# Patient Record
Sex: FEMALE | Race: WHITE | Employment: FULL TIME | URBAN - METROPOLITAN AREA
[De-identification: names, ages, dates, MRNs, and addresses within clinical notes are randomized per-mention and may not be internally consistent; named-entity substitution may affect disease eponyms.]

---

## 2018-06-26 ENCOUNTER — OFFICE VISIT (OUTPATIENT)
Dept: FAMILY MEDICINE CLINIC | Age: 28
End: 2018-06-26

## 2018-06-26 VITALS
HEART RATE: 85 BPM | OXYGEN SATURATION: 98 % | TEMPERATURE: 98.1 F | DIASTOLIC BLOOD PRESSURE: 75 MMHG | HEIGHT: 64 IN | WEIGHT: 146.5 LBS | BODY MASS INDEX: 25.01 KG/M2 | RESPIRATION RATE: 20 BRPM | SYSTOLIC BLOOD PRESSURE: 112 MMHG

## 2018-06-26 DIAGNOSIS — Z01.818 PREOP EXAMINATION: Primary | ICD-10-CM

## 2018-06-26 DIAGNOSIS — R22.9 SKIN NODULE: ICD-10-CM

## 2018-06-26 LAB
HCG URINE, QL. (POC): NEGATIVE
VALID INTERNAL CONTROL?: YES

## 2018-06-26 RX ORDER — DEXTROAMPHETAMINE SACCHARATE, AMPHETAMINE ASPARTATE, DEXTROAMPHETAMINE SULFATE AND AMPHETAMINE SULFATE 5; 5; 5; 5 MG/1; MG/1; MG/1; MG/1
TABLET ORAL
Refills: 0 | COMMUNITY
Start: 2018-05-29

## 2018-06-26 NOTE — PATIENT INSTRUCTIONS
1) Pre-Operative:  - Avoid eating or drinking anything for eight to 12 hours before the procedure, or as directed by surgeon;  - Inform your physician of medications to control diabetes, hypertension, high cholesterol or angina;  - Increase fluid intake a few days before the procedure.  -Please verify that you should take your blood pressure medications with a small sip of water the morning of surgery with your surgeon.  -Ask your surgeon if he/she wants you to discontinue any medication prior to surgery, such as anticoagulants (aspirin, xarelto, etc)   -A scopolamine patch prior to surgery can help with nausea after the surgery. Talk to your surgeon about this medication. Post-Operative:  - Follow instructions from surgeon to help speed recovery  - Good nutrition and sleep with help with healing process    2) Referral to Dr. Denisse Hargrove, General Surgery. Please make an appointment with Dr. Son Valladares. [id number] [facility] at [facility] at [address] [place] [address], [place] [address], [address] [facility], [address]  Phone: [phone]       Bunions: Care Instructions  Your Care Instructions    A bunion is a bump on the outside of the joint at the bottom of your big toe. It can cause pain and swelling in the toe. A bunion forms when bone or tissue around the joint becomes swollen from too much pressure. You also can have a bunionette, or tailor's bunion, which forms on the joint of the little toe. Sometimes, a bunion on the big toe turns the toe in toward the second toe. This is called displacement. It can lead to problems with the other toes. You can get a bunion from having an unusual walking style, having flatfeet, or wearing tight-fitting shoes. You can treat most bunions at home with a few simple steps. If you have a lot of pain, your doctor may inject medicine into the bunion to reduce swelling for a while. If you still have pain, you may need to have surgery.   Follow-up care is a key part of your treatment and safety. Be sure to make and go to all appointments, and call your doctor if you are having problems. It's also a good idea to know your test results and keep a list of the medicines you take. How can you care for yourself at home? · Ask your doctor if you can take an over-the-counter pain medicine, such as acetaminophen (Tylenol), ibuprofen (Advil, Motrin), or naproxen (Aleve). Be safe with medicines. Read and follow all instructions on the label. · Wear shoes that have a wide and deep space for the toes. Also, wear shoes that have low or flat heels and good arch supports. Do not wear tight, narrow, or high-heeled shoes. · Try bunion pads, arch supports, toe spacers, or shoe inserts. They can help shift your weight when you walk to take pressure off your big toe. · Put moleskin or another type of cushion on or around the bunion to keep it from rubbing against your shoe. · Put ice or a cold pack on the area for 10 to 20 minutes at a time as needed. Put a thin cloth between the ice and your skin. · Prop up your foot on a pillow when you ice your toe or anytime you sit or lie down. Try to keep it above the level of your heart. This will help reduce swelling. When should you call for help? Call your doctor now or seek immediate medical care if:  ? · You have severe pain. ? · Your toe is cool or pale or changes color. ? · You have tingling, weakness, or numbness in the toe. ? Watch closely for changes in your health, and be sure to contact your doctor if:  ? · Pain and swelling get worse. ? · You do not get better as expected. Where can you learn more? Go to http://kobe-burke.info/. Enter H210 in the search box to learn more about \"Bunions: Care Instructions. \"  Current as of: March 21, 2017  Content Version: 11.4  © 8060-2750 [employer]. Care instructions adapted under license by [email or web address] (which disclaims liability or warranty for this information).  If you have questions about a medical condition or this instruction, always ask your healthcare professional. [address] any warranty or liability for your use of this information. Bunionectomy: What to Expect at [address] had bunion surgery to remove a lump of bone (bunion) from the joint where your big toe joins your foot, and to straighten your big toe. You will have pain and swelling that slowly improves in the 6 weeks after surgery. You may have some minor pain and swelling that lasts as long as 6 months to a year. After surgery, you will need to wear a cast or a special type of shoe to protect your toe and to keep it in the right position for at least 3 to 6 weeks. After some types of surgeries, a cast or special shoe is used for a few months. Your doctor will remove your stitches or sutures about 2 weeks after the surgery. If you have removable pins holding your toe in place, they are usually removed in about 4 to 6 weeks. This care sheet gives you a general idea about how long it will take for you to recover. But each person recovers at a different pace. Follow the steps below to get better as quickly as possible. How can you care for yourself at home? Activity  ? · Rest when you feel tired. Getting enough sleep will help you recover. ? · Ask your doctor when you can drive again. ? · You may shower, unless your doctor tells you not to. Keep the bandage dry. If the bandage has been removed, you can wash the area with warm water and soap. Pat the area dry. ? · You will probably need to take several weeks off from work. How much time you need to take off depends on the type of work you do and the extent of your surgery. ? · You may need to avoid heavy lifting for 3 to 8 weeks or longer, depending on the type of surgery you had.   ? · You may need to do regular rehabilitation (rehab) exercises to strengthen your foot and improve movement.  Start out slowly, and follow your doctor's instructions. Diet  ? · You can eat your normal diet. If your stomach is upset, try bland, low-fat foods like plain rice, broiled chicken, toast, and yogurt. ? · You may notice that your bowel movements are not regular right after your surgery. This is common. Try to avoid constipation and straining with bowel movements. You may want to take a fiber supplement every day. If you have not had a bowel movement after a couple of days, ask your doctor about taking a mild laxative. Medicines  ? · Your doctor will tell you if and when you can restart your medicines. He or she will also give you instructionsabout taking any new medicines. ? · If you take blood thinners, such as warfarin (Coumadin), clopidogrel (Plavix), or aspirin, be sure to talk to yourdoctor. He or she will tell you if and when to start taking those medicines again. Make sure that you understandexactly what your doctor wants you to do. ? · Be safe with medicines. Take pain medicines exactly as directed. ¨ If the doctor gave you a prescription medicine for pain, take it as prescribed. ¨ If you are not taking a prescription pain medicine, ask your doctor if you can take an over-the-counter medicine. ? · If your doctor prescribed antibiotics, take them as directed. Do not stop taking them just because you feel better. You need to take the full course of antibiotics. ? · If you think your pain medicine is making you sick to your stomach:  ¨ Take your medicine after meals (unless your doctor has told you not to). ¨ Ask your doctor for a different pain medicine. Incision care  ? · You will leave the hospital with bandages holding your toe in the correct position. Your doctor will probably remove the bandages after several days. Or your doctor may have you remove your bandages at home. Do not touch the surgery area. Keep it dry. ? · Do not soak your foot until your doctor says it is okay. Ice and elevation  ? · For pain and swelling, put ice or a cold pack on your foot for 10 to 20 minutes each hour. Put a thin cloth between the ice and your skin. ? · Prop up your foot and leg on a pillow when you ice it or anytime you sit or lie down during the next 3 days. Try to keep it above the level of your heart. This will help reduce swelling. Follow-up care is a key part of your treatment and safety. Be sure to make and go to all appointments, and call your doctor if you are having problems. It's also a good idea to know your test results and keep a list of the medicines you take. When should you call for help? Call 911 anytime you think you may need emergency care. For example, call if:  ? · You passed out (lost consciousness). ? · You have sudden chest pain and shortness of breath, or you cough up blood. ? · You have severe trouble breathing. ?Call your doctor now or seek immediate medical care if:  ? · Your foot or toe is cool or pale or changes color. ? · You have numbness, tingling, or less feeling in your foot or toes. ? · You have pain that does not get better after you take pain medicine. ? · You have loose stitches, or your incision comes open. ? · Bright red blood has soaked through the bandage over your incision. ? · You have signs of infection, such as:  ¨ Increased pain, swelling, warmth, or redness. ¨ Red streaks leading from the incision. ¨ Pus draining from the incision. ¨ Swollen lymph nodes in your neck, armpits, or groin. ¨ A fever. ? Watch closely for any changes in your health, and be sure to contact your doctor if:  ? · You do not have a bowel movement after taking a laxative. Where can you learn more? Go to http://kobe-burke.info/. Enter 0699 472 [id number] in the search box to learn more about \"Bunionectomy: What to Expect at Home. \"  Current as of: March 21, 2017  Content Version: 11.4  © 1185-5562 [employer].  Care instructions adapted under license by [employer] (which disclaims liability or warranty for this information). If you have questions about a medical condition or this instruction, always ask your healthcare professional. [address] any warranty or liability for your use of this information.

## 2018-06-26 NOTE — MR AVS SNAPSHOT
[address] [place] [address] 
[address] [place] [address] 
[phone] Patient: Regan Funez MRN: [id number] :1990 Visit Information Date & Time Provider Department Dept. Phone Encounter #  
 2018  1:20 PM Yocasta Sainz NP [facility] [phone] [id number] Upcoming Health Maintenance Date Due  
 HPV Age 9Y-34Y (1 of 1 - Female 3 Dose Series) 10/23/2002 PAP AKA CERVICAL CYTOLOGY 10/23/2012 Influenza Age 5 to Adult 2018 DTaP/Tdap/Td series (2 - Td) 2026 Allergies as of 2018  Review Complete On: 2018 By: Yocasta Sainz NP No Known Allergies Current Immunizations  Never Reviewed No immunizations on file. Not reviewed this visit You Were Diagnosed With   
  
 Codes Comments Preop examination    -  Primary ICD-10-CM: D02.378 ICD-9-CM: V72.84 Skin nodule     ICD-10-CM: R22.9 ICD-9-CM: 950. 2 Vitals BP Pulse Temp Resp Height(growth percentile) Weight(growth percentile) 112/75 (BP 1 Location: Left arm, BP Patient Position: Sitting) 85 98.1 °F (36.7 °C) (Oral) 20 5' 4\" (1.626 m) 146 lb 8 oz (66.5 kg) SpO2 BMI OB Status Smoking Status 98% 25.15 kg/m2 Having regular periods Never Smoker BMI and BSA Data Body Mass Index Body Surface Area  
 25.15 kg/m 2 1.73 m 2 Your Updated Medication List  
  
   
This list is accurate as of 18  2:17 PM.  Always use your most recent med list.  
  
  
  
  
 desog-e.estradiol/e.estradiol 0.15-0.02 mgx21 /0.01 mg x 5 Tab Commonly known as:  Aide Izabella Take  by mouth. dextroamphetamine-amphetamine 20 mg tablet Commonly known as:  ADDERALL  
take 2 and 1/2 tablets by mouth daily We Performed the Following AMB POC URINE PREGNANCY TEST, VISUAL COLOR COMPARISON [23016 CPT(R)] METABOLIC PANEL, BASIC [56665 CPT(R)] REFERRAL TO GENERAL SURGERY [REF27 Custom] Comments:  
 Please evaluate nodule on right pectoral. THanks. Referral Information Referral ID Referred By Referred To  
  
 [id number] Harini Dempsey MD   
   [address] [facility] [phone] [id number] [place] [address], [address] Phone: [phone] Fax: [phone] Visits Status Start Date End Date 1 New Request 6/26/18 6/26/19 If your referral has a status of pending review or denied, additional information will be sent to support the outcome of this decision. Patient Instructions 1) Pre-Operative: - Avoid eating or drinking anything for eight to 12 hours before the procedure, or as directed by surgeon; 
- Inform your physician of medications to control diabetes, hypertension, high cholesterol or angina; 
- Increase fluid intake a few days before the procedure. 
-Please verify that you should take your blood pressure medications with a small sip of water the morning of surgery with your surgeon. 
-Ask your surgeon if he/she wants you to discontinue any medication prior to surgery, such as anticoagulants (aspirin, xarelto, etc)  
-A scopolamine patch prior to surgery can help with nausea after the surgery. Talk to your surgeon about this medication. Post-Operative: - Follow instructions from surgeon to help speed recovery - Good nutrition and sleep with help with healing process 2) Referral to Dr. Jahaira Morales, General Surgery. Please make an appointment with Dr. Darcy Dean. [address] [facility] Surgery at [facility] at [address] [facility], [place] [address], [address] [facility], [facility], [address] Phone: [phone] Bunions: Care Instructions Your Care Instructions A bunion is a bump on the outside of the joint at the bottom of your big toe. It can cause pain and swelling in the toe. A bunion forms when bone or tissue around the joint becomes swollen from too much pressure.  You also can have a bunionette, or tailor's bunion, which forms on the joint of the little toe. Sometimes, a bunion on the big toe turns the toe in toward the second toe. This is called displacement. It can lead to problems with the other toes. You can get a bunion from having an unusual walking style, having flatfeet, or wearing tight-fitting shoes. You can treat most bunions at home with a few simple steps. If you have a lot of pain, your doctor may inject medicine into the bunion to reduce swelling for a while. If you still have pain, you may need to have surgery. Follow-up care is a key part of your treatment and safety. Be sure to make and go to all appointments, and call your doctor if you are having problems. It's also a good idea to know your test results and keep a list of the medicines you take. How can you care for yourself at home? · Ask your doctor if you can take an over-the-counter pain medicine, such as acetaminophen (Tylenol), ibuprofen (Advil, Motrin), or naproxen (Aleve). Be safe with medicines. Read and follow all instructions on the label. · Wear shoes that have a wide and deep space for the toes. Also, wear shoes that have low or flat heels and good arch supports. Do not wear tight, narrow, or high-heeled shoes. · Try bunion pads, arch supports, toe spacers, or shoe inserts. They can help shift your weight when you walk to take pressure off your big toe. · Put moleskin or another type of cushion on or around the bunion to keep it from rubbing against your shoe. · Put ice or a cold pack on the area for 10 to 20 minutes at a time as needed. Put a thin cloth between the ice and your skin. · Prop up your foot on a pillow when you ice your toe or anytime you sit or lie down. Try to keep it above the level of your heart. This will help reduce swelling. When should you call for help? Call your doctor now or seek immediate medical care if: 
? · You have severe pain. ? · Your toe is cool or pale or changes color. ? · You have tingling, weakness, or numbness in the toe. ? Watch closely for changes in your health, and be sure to contact your doctor if: 
? · Pain and swelling get worse. ? · You do not get better as expected. Where can you learn more? Go to http://kobe-burke.info/. Enter H210 in the search box to learn more about \"Bunions: Care Instructions. \" Current as of: March 21, 2017 Content Version: 11.4 © 0987-3137 Nujira. Care instructions adapted under license by Kupon[employer] (which disclaims liability or warranty for this information). If you have questions about a medical condition or this instruction, always ask your healthcare professional. [address] any warranty or liability for your use of this information. Bunionectomy: What to Expect at [facility] Your Recovery You had bunion surgery to remove a lump of bone (bunion) from the joint where your big toe joins your foot, and to straighten your big toe. You will have pain and swelling that slowly improves in the 6 weeks after surgery. You may have some minor pain and swelling that lasts as long as 6 months to a year. After surgery, you will need to wear a cast or a special type of shoe to protect your toe and to keep it in the right position for at least 3 to 6 weeks. After some types of surgeries, a cast or special shoe is used for a few months. Your doctor will remove your stitches or sutures about 2 weeks after the surgery. If you have removable pins holding your toe in place, they are usually removed in about 4 to 6 weeks. This care sheet gives you a general idea about how long it will take for you to recover. But each person recovers at a different pace. Follow the steps below to get better as quickly as possible. How can you care for yourself at home? Activity ? · Rest when you feel tired. Getting enough sleep will help you recover. ? · Ask your doctor when you can drive again. ? · You may shower, unless your doctor tells you not to. Keep the bandage dry. If the bandage has been removed, you can wash the area with warm water and soap. Pat the area dry. ? · You will probably need to take several weeks off from work. How much time you need to take off depends on the type of work you do and the extent of your surgery. ? · You may need to avoid heavy lifting for 3 to 8 weeks or longer, depending on the type of surgery you had.  
? · You may need to do regular rehabilitation (rehab) exercises to strengthen your foot and improve movement. Start out slowly, and follow your doctor's instructions. Diet ? · You can eat your normal diet. If your stomach is upset, try bland, low-fat foods like plain rice, broiled chicken, toast, and yogurt. ? · You may notice that your bowel movements are not regular right after your surgery. This is common. Try to avoid constipation and straining with bowel movements. You may want to take a fiber supplement every day. If you have not had a bowel movement after a couple of days, ask your doctor about taking a mild laxative. Medicines ? · Your doctor will tell you if and when you can restart your medicines. He or she will also give you instructionsabout taking any new medicines. ? · If you take blood thinners, such as warfarin (Coumadin), clopidogrel (Plavix), or aspirin, be sure to talk to yourdoctor. He or she will tell you if and when to start taking those medicines again. Make sure that you understandexactly what your doctor wants you to do. ? · Be safe with medicines. Take pain medicines exactly as directed. ¨ If the doctor gave you a prescription medicine for pain, take it as prescribed. ¨ If you are not taking a prescription pain medicine, ask your doctor if you can take an over-the-counter medicine. ? · If your doctor prescribed antibiotics, take them as directed. Do not stop taking them just because you feel better. You need to take the full course of antibiotics. ? · If you think your pain medicine is making you sick to your stomach: 
¨ Take your medicine after meals (unless your doctor has told you not to). ¨ Ask your doctor for a different pain medicine. Incision care ? · You will leave the hospital with bandages holding your toe in the correct position. Your doctor will probably remove the bandages after several days. Or your doctor may have you remove your bandages at home. Do not touch the surgery area. Keep it dry. ? · Do not soak your foot until your doctor says it is okay. Ice and elevation ? · For pain and swelling, put ice or a cold pack on your foot for 10 to 20 minutes each hour. Put a thin cloth between the ice and your skin. ? · Prop up your foot and leg on a pillow when you ice it or anytime you sit or lie down during the next 3 days. Try to keep it above the level of your heart. This will help reduce swelling. Follow-up care is a key part of your treatment and safety. Be sure to make and go to all appointments, and call your doctor if you are having problems. It's also a good idea to know your test results and keep a list of the medicines you take. When should you call for help? Call 911 anytime you think you may need emergency care. For example, call if: 
? · You passed out (lost consciousness). ? · You have sudden chest pain and shortness of breath, or you cough up blood. ? · You have severe trouble breathing. ?Call your doctor now or seek immediate medical care if: 
? · Your foot or toe is cool or pale or changes color. ? · You have numbness, tingling, or less feeling in your foot or toes. ? · You have pain that does not get better after you take pain medicine. ? · You have loose stitches, or your incision comes open. ? · Bright red blood has soaked through the bandage over your incision. ? · You have signs of infection, such as: 
¨ Increased pain, swelling, warmth, or redness. ¨ Red streaks leading from the incision. ¨ Pus draining from the incision. ¨ Swollen lymph nodes in your neck, armpits, or groin. ¨ A fever. ? Watch closely for any changes in your health, and be sure to contact your doctor if: 
? · You do not have a bowel movement after taking a laxative. Where can you learn more? Go to http://kobe-burke.info/. Enter 0699 [id number] in the search box to learn more about \"Bunionectomy: What to Expect at Home. \" Current as of: March 21, 2017 Content Version: 11.4 © 8491-4431 Localocracy. Care instructions adapted under license by LISNR (which disclaims liability or warranty for this information). If you have questions about a medical condition or this instruction, always ask your healthcare professional. [place] [address] any warranty or liability for your use of this information. Introducing [facility]! [employer] introduces [employer] patient portal. Now you can access parts of your medical record, email your doctor's office, and request medication refills online. 1. In your internet browser, go to [email or web address] [email or web address] 2. Click on the First Time User? Click Here link in the Sign In box. You will see the New Member Sign Up page. 3. Enter your Choose Energy Access Code exactly as it appears below. You will not need to use this code after youve completed the sign-up process. If you do not sign up before the expiration date, you must request a new code. · [employer] Access Code: [email or web address] Expires: 9/24/2018  2:17 PM 
 
4. Enter the last four digits of your Social Security Number (xxxx) and Date of Birth (mm/dd/yyyy) as indicated and click Submit. You will be taken to the next sign-up page. 5. Create a RedBee ID. This will be your RedBee login ID and cannot be changed, so think of one that is secure and easy to remember. 6. Create a RedBee password. You can change your password at any time. 7. Enter your Password Reset Question and Answer. This can be used at a later time if you forget your password. 8. Enter your e-mail address. You will receive e-mail notification when new information is available in [address] 9. Click Sign Up. You can now view and download portions of your medical record. 10. Click the Download Summary menu link to download a portable copy of your medical information. If you have questions, please visit the Frequently Asked Questions section of the RedBee website. Remember, RedBee is NOT to be used for urgent needs. For medical emergencies, dial 911. Now available from your iPhone and Android! Please provide this summary of care documentation to your next provider. Your primary care clinician is listed as Marilu Don. If you have any questions after today's visit, please call [phone].

## 2018-06-26 NOTE — PROGRESS NOTES
Chief Complaint   Patient presents with    Pre-op Exam     bunion on left foot/surgery 2018    Other     routine    Other     pregnancy test       Luis Mckenna  Identified pt with two pt identifiers(name and ). Chief Complaint   Patient presents with    Pre-op Exam     bunion on left foot/surgery 2018    Other     routine    Other     pregnancy test       1. Have you been to the ER, urgent care clinic since your last visit? N Hospitalized since your last visit? No    2. Have you seen or consulted any other health care providers outside of the [address] [place] [address] since your last visit? N  Include any pap smears or colon screening. No    Today's provider has been notified of reason for visit, vitals and flowsheets obtained on patients.      Patient received paperwork for advance directive during previous visit but has not completed at this time     Reviewed record In preparation for visit, huddled with provider and have obtained necessary documentation    Verbal order to collect urine and perform pregnancy test    Health Maintenance Due   Topic    HPV Age 9Y-34Y (1 of 3 - Female 3 Dose Series)    PAP AKA CERVICAL CYTOLOGY        Wt Readings from Last 3 Encounters:   18 146 lb 8 oz (66.5 kg)   16 143 lb 9.6 oz (65.1 kg)   14 143 lb 3.2 oz (65 kg)     Temp Readings from Last 3 Encounters:   18 98.1 °F (36.7 °C) (Oral)   16 96.7 °F (35.9 °C) (Oral)   14 97.5 °F (36.4 °C) (Oral)     BP Readings from Last 3 Encounters:   18 112/75   16 93/49   14 91/65     Pulse Readings from Last 3 Encounters:   18 85   16 65   14 67     Vitals:    18 1342   BP: 112/75   Pulse: 85   Resp: 20   Temp: 98.1 °F (36.7 °C)   TempSrc: Oral   SpO2: 98%   Weight: 146 lb 8 oz (66.5 kg)   Height: 5' 4\" (1.626 m)   PainSc:   0 - No pain         Learning Assessment:  :     Learning Assessment 2014   PRIMARY LEARNER Patient Patient   HIGHEST LEVEL OF EDUCATION - PRIMARY LEARNER  4 YEARS OF COLLEGE 4 YEARS OF COLLEGE   BARRIERS PRIMARY LEARNER NONE NONE   CO-LEARNER CAREGIVER - No   PRIMARY LANGUAGE ENGLISH ENGLISH   LEARNER PREFERENCE PRIMARY DEMONSTRATION LISTENING     LISTENING -     PICTURES -     READING -     VIDEOS -   ANSWERED BY patient patient   RELATIONSHIP SELF SELF       Depression Screening:  :     PHQ over the last two weeks 6/26/2018   Little interest or pleasure in doing things Not at all   Feeling down, depressed or hopeless Not at all   Total Score PHQ 2 0       Fall Risk Assessment:  :     No flowsheet data found. Abuse Screening:  :     No flowsheet data found. ADL Screening:  :     ADL Assessment 6/26/2018   Feeding yourself No Help Needed   Getting from bed to chair No Help Needed   Getting dressed No Help Needed   Bathing or showering No Help Needed   Walk across the room (includes cane/walker) No Help Needed   Using the telphone No Help Needed   Taking your medications No Help Needed   Preparing meals No Help Needed   Managing money (expenses/bills) No Help Needed   Moderately strenuous housework (laundry) No Help Needed   Shopping for personal items (toiletries/medicines) No Help Needed   Shopping for groceries No Help Needed   Driving No Help Needed   Climbing a flight of stairs No Help Needed   Getting to places beyond walking distances No Help Needed                 Medication reconciliation up to date and corrected with patient at this time.

## 2018-06-26 NOTE — PROGRESS NOTES
S: Caprice Crum is a 33 y.o. female who presents for pre-operative clearance  Patient has paperwork and pre op work up requires BMP, preg test    Assessment/Plan:  1. Preop examination  -Procedure: bunion on left foot   -BMP today, preg = negative  -Based upon the examination performed today, no contraindications have been noted and Caprice Crum  is an acceptable risk for the proposed procedure, pending results of BMP.      2. Skin lesion  -chest: right pectoral - 1cm mobile nodule  -referral to Dr. Mario Victoria, gen surg    Forms filled out, copy given to pt and paperwork will be faxed to [phone] once BMP results are finalized     HPI:    Surgical procedure: left bunion removal   Anesthesia: pt unsure  Surgery date: 7/26/18  Surgeon: Dr. Serge Anderson  Phone: [phone]              No allergy to latex  No allergies to banana, kiwi or avocado  No problems breathing during dental exam  Previous surgeries - no previous reaction to anesthesia  No Obstructive sleep apnea  No metal in body  Anti-coag: none  LMP: 6/19/18  Preg test = neg    This is considered a low to intermediate risk surgery.   Functional capacity is >4 Metabolic Equivalents (METs) without symptoms - METS 6+    Lump on chest   [facility] - said to watch it  Was told to go to breast surgeon   Sometimes painful, bothers her when she is lifting weight     Review of Systems:  - Constitutional Symptoms: no fevers, chills, weight loss  - Eyes: no blurry vision or double vision  - Cardiovascular: no chest pain or palpitations  - Respiratory: no cough or shortness of breath  - Gastrointestinal: no dysphagia or abdominal pain, + heartburn and will take antacid and will help  - Musculoskeletal: no joint pains or weakness  - Integumentary: no rashes  - Neurological: no numbness, tingling, or headaches    Social history:   Nutrition: overall healthy, water 64oz   Physical: minimal due to bunion   Social: lives with fiancee, planning to get  May 2019   Occupation:  for [employer]   Tobacco/Drugs: none   Alcohol: 4 drinks/ week     I reviewed the following:  Past Medical History:   Diagnosis Date    Irregular menses     Varicose veins 2014    Dr. Sue Gallagher (Reflux in left Greater Saphenous) s/p thermal ablation and micro-phlebectomy     The patient has a family history of  Current Outpatient Prescriptions   Medication Sig Dispense Refill    dextroamphetamine-amphetamine (ADDERALL) 20 mg tablet take 2 and 1/2 tablets by mouth daily  0    desog-e.estradiol/e.estradiol (KARIVA) 0.15-0.02 mgx21 /0.01 mg x 5 tab Take  by mouth. Focus MD prescribing adderall     No Known Allergies    O: VS:   Visit Vitals    /75 (BP 1 Location: Left arm, BP Patient Position: Sitting)    Pulse 85    Temp 98.1 °F (36.7 °C) (Oral)    Resp 20    Ht 5' 4\" (1.626 m)    Wt 146 lb 8 oz (66.5 kg)    SpO2 98%    BMI 25.15 kg/m2     GENERAL: Rani Monaco is in no acute distress. Non-toxic. Well nourished. Well developed. Appropriately groomed. HEAD:  Normocephalic. Atraumatic. Non tender sinuses x 4. EYE: PERRLA. EOMs intact. Sclera anicteric without injection. No drainage or discharge. EARS: Hearing intact bilaterally. External ear canals normal without evidence of blood or swelling. Bilateral TM's intact, pearly grey with landmarks visible. No erythema or effusion. NOSE: Patent. Nasal turbinates pink. No erythema. No discharge. MOUTH: mucous membranes pink and moist. Posterior pharynx normal with cobblestone appearance. No erythema, white exudate or obstruction. NECK: supple. Midline trachea. No cervical adenopathy noted. RESP: Breath sounds are symmetrical bilaterally. Unlabored without SOB. Speaking in full sentences. Clear to auscultation bilaterally anteriorly and posteriorly. No wheezes. No rales or rhonchi. CV: normal rate. Regular rhythm. S1, S2 audible. No murmur noted. No rubs, clicks or gallops noted.   ABDOMEN: Flat without bulges or pulsations. Soft and nondistended. No tenderness on palpation. No masses or organomegaly. No rebound, rigidity or guarding. Bowel sounds normal x 4 quadrants. BACK: No visible deformities or curvature. Full ROM. No pain on palpation of the spinous processes in the cervical, thoracic, lumbar, sacral regions. No CVA tenderness. NEURO:  awake, alert and oriented to person, place, and time and event. Cranial nerves II through XII intact. Clear speech. Muscle strength is +5/5 x 4 extremities. Sensation is intact to light touch bilaterally. Steady gait. MUSC:  Intact x 4 extremities. Full ROM x 4 extremities. No pain with movement. HEME/LYMPH: peripheral pulses palpable 2+ x 4 extremities. No peripheral edema is noted. No rashes noted. SKIN: clean and dry. Good turgor. Chest: right pectoral - 1cm mobile nodule. No ttp  PSYCH: appropriate behavior, dress and thought processes. Good eye contact. Clear and coherent speech. Full affect. Good insight.   _____________________________________________________________________  Patient education was done. Advised on nutrition, physical activity, weight management, tobacco, alcohol and safety. Patient verbalized understanding and agreed to plan of care. Patient was given an after visit summary which included current diagnoses, medications and vital signs.

## 2018-06-27 LAB
BUN SERPL-MCNC: 9 MG/DL (ref 6–20)
BUN/CREAT SERPL: 14 (ref 9–23)
CALCIUM SERPL-MCNC: 9.5 MG/DL (ref 8.7–10.2)
CHLORIDE SERPL-SCNC: 101 MMOL/L (ref 96–106)
CO2 SERPL-SCNC: 24 MMOL/L (ref 20–29)
CREAT SERPL-MCNC: 0.64 MG/DL (ref 0.57–1)
GLUCOSE SERPL-MCNC: 93 MG/DL (ref 65–99)
POTASSIUM SERPL-SCNC: 4.2 MMOL/L (ref 3.5–5.2)
SODIUM SERPL-SCNC: 138 MMOL/L (ref 134–144)

## 2018-07-24 ENCOUNTER — OFFICE VISIT (OUTPATIENT)
Dept: SURGERY | Age: 28
End: 2018-07-24

## 2018-07-24 VITALS
RESPIRATION RATE: 18 BRPM | WEIGHT: 148 LBS | BODY MASS INDEX: 25.27 KG/M2 | OXYGEN SATURATION: 97 % | SYSTOLIC BLOOD PRESSURE: 130 MMHG | TEMPERATURE: 97.8 F | DIASTOLIC BLOOD PRESSURE: 76 MMHG | HEART RATE: 105 BPM | HEIGHT: 64 IN

## 2018-07-24 DIAGNOSIS — R22.9 SKIN NODULE: Primary | ICD-10-CM

## 2018-07-24 NOTE — PROGRESS NOTES
1. Have you been to the ER, urgent care clinic since your last visit? Hospitalized since your last visit? No    2. Have you seen or consulted any other health care providers outside of the [address] [place] [address] since your last visit? Include any pap smears or colon screening.  No

## 2018-07-24 NOTE — MR AVS SNAPSHOT
[address] [facility] [address] [id number] [phone] Patient: Diallo Bean MRN: [id number] :1990 Visit Information Date & Time Provider Department Dept. Phone Encounter #  
 2018  3:45 PM Everton Bennett, [address] [phone] [id number] Follow-up Instructions Return in about 6 months (around 2019). Routing History Upcoming Health Maintenance Date Due  
 HPV Age 9Y-34Y (1 of 1 - Female 3 Dose Series) 10/23/2002 PAP AKA CERVICAL CYTOLOGY 10/23/2012 Influenza Age 5 to Adult 2018 DTaP/Tdap/Td series (2 - Td) 2026 Allergies as of 2018  Review Complete On: 2018 By: Sandrita Cook LPN No Known Allergies Current Immunizations  Never Reviewed No immunizations on file. Not reviewed this visit You Were Diagnosed With   
  
 Codes Comments Skin nodule    -  Primary ICD-10-CM: R22.9 ICD-9-CM: 092. 2 Vitals BP Pulse Temp Resp Height(growth percentile) Weight(growth percentile) 130/76 (BP 1 Location: Right arm, BP Patient Position: Sitting) (!) 105 97.8 °F (36.6 °C) (Oral) 18 5' 4\" (1.626 m) 148 lb (67.1 kg) LMP SpO2 BMI OB Status Smoking Status 2018 97% 25.4 kg/m2 Having regular periods Never Smoker Vitals History BMI and BSA Data Body Mass Index Body Surface Area  
 25.4 kg/m 2 1.74 m 2 Preferred Pharmacy Pharmacy Name Phone [address] [facility] AT [address]. [phone] Your Updated Medication List  
  
   
This list is accurate as of 18 11:59 PM.  Always use your most recent med list.  
  
  
  
  
 desog-e.estradiol/e.estradiol 0.15-0.02 mgx21 /0.01 mg x 5 Tab Commonly known as:  Othelia Keas Take  by mouth. dextroamphetamine-amphetamine 20 mg tablet Commonly known as:  ADDERALL take 2 and 1/2 tablets by mouth daily Follow-up Instructions Return in about 6 months (around 1/24/2019). Introducing [facility]! Noel Retana introduces [employer] patient portal. Now you can access parts of your medical record, email your doctor's office, and request medication refills online. 1. In your internet browser, go to https://[email or web address]. [employer] 2. Click on the First Time User? Click Here link in the Sign In box. You will see the New Member Sign Up page. 3. Enter your [employer] Access Code exactly as it appears below. You will not need to use this code after youve completed the sign-up process. If you do not sign up before the expiration date, you must request a new code. · [employer] Access Code: [email or web address] Expires: 9/24/2018  2:17 PM 
 
4. Enter the last four digits of your Social Security Number (xxxx) and Date of Birth (mm/dd/yyyy) as indicated and click Submit. You will be taken to the next sign-up page. 5. Create a [employer] ID. This will be your [employer] login ID and cannot be changed, so think of one that is secure and easy to remember. 6. Create a [employer] password. You can change your password at any time. 7. Enter your Password Reset Question and Answer. This can be used at a later time if you forget your password. 8. Enter your e-mail address. You will receive e-mail notification when new information is available in [address] 9. Click Sign Up. You can now view and download portions of your medical record. 10. Click the Download Summary menu link to download a portable copy of your medical information. If you have questions, please visit the Frequently Asked Questions section of the [employer] website. Remember, [employer] is NOT to be used for urgent needs. For medical emergencies, dial 911. Now available from your iPhone and Android! Please provide this summary of care documentation to your next provider. Your primary care clinician is listed as Elizabeth Miss. If you have any questions after today's visit, please call [phone].

## 2018-08-09 PROBLEM — R22.9 SKIN NODULE: Status: ACTIVE | Noted: 2018-08-09

## 2018-08-09 NOTE — PROGRESS NOTES
[employer] General Surgery History and Physical    Chief Complaint: nodule overlying chest    History of Present Illness:      Pablo Maldonado is a 33 y.o. female who was kindly referred by Jarad Yancey NP for evaluation of a nodule overlying the right chest wall. The patient states she first noted this in Nov 2017. She thinks it may have gotten a little larger since that time. The nodule is not painful and has never been infected or draining. She had a formal ultrasound performed by her GYN's office that suggested this lesion may be a fibroadenoma and she was going to have an excisional biopsy done but then apparently her GYN physician decided not to proceed with this. She denies any history of trauma to this area. She does not have the U/S report or images with her today. The lesion overlies the border of her sternum on the right chest above her breast.  She denies any breast complaints including no other palpable masses, no breast pain, nipple discharge, skin changes or other complaints. She has never had a mammogram before or breast biopsy. She does take OCP's but has not been pregnant and does not have children. She has otherwise been in her usual state of health without complaints.         Past Medical History:   Diagnosis Date    Irregular menses     Varicose veins 2014    Dr. Shruthi Larson (Reflux in left Greater Saphenous) s/p thermal ablation and micro-phlebectomy       Past Surgical History:   Procedure Laterality Date    HX HEENT      wisdom teeth extraction    HX OTHER SURGICAL      Left Micro Phlebectomy- Shruthi Larson, Vascular surgery    VASCULAR SURGERY PROCEDURE UNLIST  9/2015    vein removed in left calf and thigh       Social History     Social History    Marital status: SINGLE     Spouse name: N/A    Number of children: N/A    Years of education: N/A     Occupational History          [employer]     Social History Main Topics    Smoking status: Never Smoker    Smokeless tobacco: Never Used    Alcohol use 0.0 oz/week     1 - 2 Shots of liquor per week      Comment: weekend    Drug use: No    Sexual activity: Yes     Partners: Male     Birth control/ protection: Pill     Other Topics Concern    Not on file     Social History Narrative       Family History   Problem Relation Age of Onset    Hypertension Father     Depression Father     Other Father      arthritis in foot/bone spurs in foot    Cancer Maternal Grandmother      lung    Cancer Maternal Grandfather      pancreatic    Prostate Cancer Maternal Grandfather     Stroke Paternal Grandmother          Current Outpatient Prescriptions:     dextroamphetamine-amphetamine (ADDERALL) 20 mg tablet, take 2 and 1/2 tablets by mouth daily, Disp: , Rfl: 0    desog-e.estradiol/e.estradiol (KARIVA) 0.15-0.02 mgx21 /0.01 mg x 5 tab, Take  by mouth., Disp: , Rfl:     No Known Allergies    ROS   Constitutional: negative  Ears, Nose, Mouth, Throat, and Face: negative  Respiratory: negative  Cardiovascular: negative  Gastrointestinal: negative  Genitourinary:negative  Integument/Breast: See HPI  Hematologic/Lymphatic: negative  Behavioral/Psychiatric: negative  Allergic/Immunologic: negative      Physical Exam:     Visit Vitals    /76 (BP 1 Location: Right arm, BP Patient Position: Sitting)    Pulse (!) 105    Temp 97.8 °F (36.6 °C) (Oral)    Resp 18    Ht 5' 4\" (1.626 m)    Wt 148 lb (67.1 kg)    LMP 07/20/2018    SpO2 97%    BMI 25.4 kg/m2       General - alert and oriented, no apparent distress  HEENT - NC/AT. No scleral icterus  Pulm - CTAB, normal inspiratory effort  Breast/Chest - Bilateral breast exam without masses or focal nodularity in the breast tissue. NAC without evidence of discharge or nipple inversion. No skin changes.   Overlying the border of the sternum near the 2nd/3rd rib, there is a ~ 1 cm flat, mobile lesion that feels regular and soft, within the subcutaneous tissue. CV - RRR, no M/R/G  Abd - soft, NT, ND. No palpable masses or organomegaly. Ext - warm, well perfused, no edema  Lymphatics - no cervical, supraclavicular, axillary or inguinal adenopathy noted  Skin - supple, no rashes  Psychiatric - normal affect, good mood    Labs  None    Imaging  None available for me to review. Assessment:     Diane Marvin is a 33 y.o. female with a small subcutaneous lesion in the anterior chest soft tissue. Recommendations:     1. I think this lesion likely represents a sebaceous cyst or possibly even a small lipoma based upon clinical exam.  I do not think this would be imaged on a mammogram due to its location. My suspicion for a malignant process is extremely low. I have advised a follow up exam in 6 months with an ultrasound at that time to assess for interval change. She will also perform exams at home and call if this demonstrates growth in the interim. I did offer to do an excisional biopsy as an alternative strategy but she would prefer clinical exam and imaging follow up which I think is very reasonable. I will try to get a copy of her previous U/S for comparison. 20 mins of time was spent with the patient of which > 50% of the time involved face-to-face counseling of the patient regarding the proposed treatment plan.       Shadi Lott MD  7/24/2018    CC: Alfonso Burgos NP

## 2018-08-14 ENCOUNTER — DOCUMENTATION ONLY (OUTPATIENT)
Dept: SURGERY | Age: 28
End: 2018-08-14

## 2018-12-27 ENCOUNTER — ANESTHESIA EVENT (OUTPATIENT)
Dept: SURGERY | Age: 28
End: 2018-12-27
Payer: COMMERCIAL

## 2018-12-27 ENCOUNTER — OFFICE VISIT (OUTPATIENT)
Dept: SURGERY | Age: 28
End: 2018-12-27

## 2018-12-27 VITALS
BODY MASS INDEX: 26.29 KG/M2 | WEIGHT: 154 LBS | SYSTOLIC BLOOD PRESSURE: 120 MMHG | HEIGHT: 64 IN | HEART RATE: 88 BPM | TEMPERATURE: 98.2 F | DIASTOLIC BLOOD PRESSURE: 72 MMHG | OXYGEN SATURATION: 97 % | RESPIRATION RATE: 18 BRPM

## 2018-12-27 DIAGNOSIS — R22.2 MASS OF CHEST WALL, RIGHT: Primary | ICD-10-CM

## 2018-12-27 NOTE — LETTER
12/27/2018 12:44 PM 
 
Ms. Ho Rodasline [id number] Surgery is scheduled as follows: 
 
Surgery date: Friday, 12-28-18 Location: [place] [address] 
 
Arrival time: 5:30 A. M.  Start time: 7:30 A. M. 
 
DO NOT EAT OR DRINK ANYTHING PAST MIDNIGHT THE NIGHT BEFORE SURGERY 
_________________________________________________________________________ 1st Post Operative appointment:  Tuesday, 1-29-19 @ 1:30 P. M. 
 
SUITE: [address] [place] [address] Phone: [phone] Nurse or appointments For questions regarding this information please contact [place] at [phone]

## 2018-12-27 NOTE — PROGRESS NOTES
1. Have you been to the ER, urgent care clinic since your last visit? Hospitalized since your last visit? No    2. Have you seen or consulted any other health care providers outside of the [address] [place] [address] since your last visit? Include any pap smears or colon screening.  No

## 2018-12-27 NOTE — PROGRESS NOTES
[facility] General Surgery History and Physical    Chief Complaint: right anterior chest wall mass    History of Present Illness:      Milton Teague is a 33 y.o. female who is known to me from prior evaluation of a right anterior chest wall mass. The patient states it has recently gotten bigger but that it fluctuates in size. The lesion was much larger and painful last week but now is getting smaller again. She denies any redness or drainage or changes in the overlying skin. She denies any breast complaints such as drainage, nipple discharge or other palpable masses. This lesion appears to be separate from her breast tissue and is overlying the border of the sternum medially. The patient denies any other complaints. She did have an ultrasound of this done in Nov 2017 that suggested this was possibly a fibroadenoma or angiolipoma. This was not biopsied and a follow up ultrasound was recommended which she has not had at this time. She recently had a bunionectomy done, but no other changes in her PMHx or PSHx.        Past Medical History:   Diagnosis Date    ADHD     Irregular menses     Varicose veins 2014    Dr. Kyra Mckeon (Reflux in left Greater Saphenous) s/p thermal ablation and micro-phlebectomy       Past Surgical History:   Procedure Laterality Date    HX HEENT      wisdom teeth extraction    HX ORTHOPAEDIC      LEFT BLADE BUNIONECTOMY    HX OTHER SURGICAL      Left Micro Phlebectomy- Kyra Mckeon, Vascular surgery    VASCULAR SURGERY PROCEDURE UNLIST  9/2015    vein removed in left calf and thigh       Social History     Socioeconomic History    Marital status: SINGLE     Spouse name: Not on file    Number of children: Not on file    Years of education: Not on file    Highest education level: Not on file   Social Needs    Financial resource strain: Not on file    Food insecurity - worry: Not on file    Food insecurity - inability: Not on file    Transportation needs - medical: Not on file    Transportation needs - non-medical: Not on file   Occupational History    Occupation:      Comment: [employer]   Tobacco Use    Smoking status: Never Smoker    Smokeless tobacco: Never Used   Substance and Sexual Activity    Alcohol use: Yes     Alcohol/week: 1.2 - 1.8 oz     Types: 2 - 3 Shots of liquor per week     Comment: weekend    Drug use: No    Sexual activity: Yes     Partners: Male     Birth control/protection: Pill   Other Topics Concern    Not on file   Social History Narrative    Not on file       Family History   Problem Relation Age of Onset    Hypertension Father     Depression Father     Other Father         arthritis in foot/bone spurs in foot    Cancer Maternal Grandmother         lung    Cancer Maternal Grandfather         pancreatic    Prostate Cancer Maternal Grandfather     Stroke Paternal Grandmother          Current Outpatient Medications:     dextroamphetamine-amphetamine (ADDERALL) 20 mg tablet, take 2 and 1/2 tablets by mouth daily, Disp: , Rfl: 0    desog-e.estradiol/e.estradiol (KARIVA) 0.15-0.02 mgx21 /0.01 mg x 5 tab, Take  by mouth., Disp: , Rfl:     No Known Allergies    ROS   Constitutional: negative  Ears, Nose, Mouth, Throat, and Face: negative  Respiratory: negative  Cardiovascular: negative  Gastrointestinal: negative  Genitourinary:negative  Integument/Breast: negative  Hematologic/Lymphatic: negative  Behavioral/Psychiatric: negative  Allergic/Immunologic: negative      Physical Exam:     Visit Vitals  /72 (BP 1 Location: Left arm, BP Patient Position: Sitting)   Pulse 88   Temp 98.2 °F (36.8 °C) (Oral)   Resp 18   Ht 5' 4\" (1.626 m)   Wt 154 lb (69.9 kg)   LMP 12/23/2018   SpO2 97%   BMI 26.43 kg/m²       General - alert and oriented, no apparent distress  HEENT - NC/AT. No scleral icterus  Pulm - CTAB, normal inspiratory effort  CV - RRR, no M/R/G  Abd - soft, NT, ND. No palpable masses or organomegaly. Breast - Bilateral breasts without masses, nodularity, skin changes, nipple inversion or signs of drainage. Chest - On the right anterior chest near the border of the sternum there is a 1.5 x 1.5 cm mobile mass that appears to be in the subcutaneous tissue. This is rounded and regular. This is separate from her breast tissue. No overlying skin changes noted. Ext - warm, well perfused, no edema  Lymphatics - no cervical, supraclavicular, axillary or inguinal adenopathy noted. Skin - supple, no rashes  Psychiatric - normal affect, good mood    Labs  None    Imaging    None recent    Assessment:     Erin Putnam is a 33 y.o. female with an enlarging subcutaneous nodule overlying the right anterior chest wall. Recommendations:     1. I have recommended an excisional biopsy of this lesion. I do not think it is a malignant lesion based upon its growth and shrinkage in size. This may be a sebaceous cyst.  I did offer ultrasound evaluation and biopsy, but she indicated she would want this removed surgically even if benign as it has been symptomatic for her. Plan for excisional biopsy of right chest wall lesion. A complete discussion of the risks, benefits and alternatives to surgery were discussed with the patient who was keen to proceed. 20 mins of time was spent with the patient of which > 50% of the time involved face-to-face counseling of the patient regarding the proposed treatment plan.       Olivia Mills MD  12/27/2018    CC: Fran Garcia NP

## 2018-12-28 ENCOUNTER — ANESTHESIA (OUTPATIENT)
Dept: SURGERY | Age: 28
End: 2018-12-28
Payer: COMMERCIAL

## 2018-12-28 ENCOUNTER — HOSPITAL ENCOUNTER (OUTPATIENT)
Age: 28
Setting detail: OUTPATIENT SURGERY
Discharge: HOME | End: 2018-12-28
Attending: SURGERY | Admitting: SURGERY
Payer: COMMERCIAL

## 2018-12-28 VITALS
BODY MASS INDEX: 26.12 KG/M2 | DIASTOLIC BLOOD PRESSURE: 70 MMHG | TEMPERATURE: 98.4 F | OXYGEN SATURATION: 99 % | WEIGHT: 153 LBS | SYSTOLIC BLOOD PRESSURE: 110 MMHG | HEART RATE: 84 BPM | RESPIRATION RATE: 13 BRPM | HEIGHT: 64 IN

## 2018-12-28 DIAGNOSIS — R22.2 NODULE OF ANTERIOR CHEST WALL: Primary | ICD-10-CM

## 2018-12-28 LAB — HCG UR QL: NEGATIVE

## 2018-12-28 PROCEDURE — 88307 TISSUE EXAM BY PATHOLOGIST: CPT

## 2018-12-28 PROCEDURE — 74011250636 HC RX REV CODE- 250/636: Performed by: SURGERY

## 2018-12-28 PROCEDURE — 77030010509 HC AIRWY LMA MSK TELE -A: Performed by: ANESTHESIOLOGY

## 2018-12-28 PROCEDURE — 77030018836 HC SOL IRR NACL ICUM -A: Performed by: SURGERY

## 2018-12-28 PROCEDURE — 77030020782 HC GWN BAIR PAWS FLX 3M -B

## 2018-12-28 PROCEDURE — 74011250636 HC RX REV CODE- 250/636

## 2018-12-28 PROCEDURE — 76210000006 HC OR PH I REC 0.5 TO 1 HR: Performed by: SURGERY

## 2018-12-28 PROCEDURE — 74011250636 HC RX REV CODE- 250/636: Performed by: ANESTHESIOLOGY

## 2018-12-28 PROCEDURE — 77030031139 HC SUT VCRL2 J&J -A: Performed by: SURGERY

## 2018-12-28 PROCEDURE — 77030002933 HC SUT MCRYL J&J -A: Performed by: SURGERY

## 2018-12-28 PROCEDURE — 74011000250 HC RX REV CODE- 250: Performed by: SURGERY

## 2018-12-28 PROCEDURE — 77030011640 HC PAD GRND REM COVD -A: Performed by: SURGERY

## 2018-12-28 PROCEDURE — 77030039266 HC ADH SKN EXOFIN S2SG -A: Performed by: SURGERY

## 2018-12-28 PROCEDURE — 76060000033 HC ANESTHESIA 1 TO 1.5 HR: Performed by: SURGERY

## 2018-12-28 PROCEDURE — 76010000149 HC OR TIME 1 TO 1.5 HR: Performed by: SURGERY

## 2018-12-28 PROCEDURE — 81025 URINE PREGNANCY TEST: CPT

## 2018-12-28 PROCEDURE — 76210000020 HC REC RM PH II FIRST 0.5 HR: Performed by: SURGERY

## 2018-12-28 PROCEDURE — 77030032490 HC SLV COMPR SCD KNE COVD -B: Performed by: SURGERY

## 2018-12-28 RX ORDER — SODIUM CHLORIDE 0.9 % (FLUSH) 0.9 %
5-10 SYRINGE (ML) INJECTION EVERY 8 HOURS
Status: DISCONTINUED | OUTPATIENT
Start: 2018-12-28 | End: 2018-12-28 | Stop reason: HOSPADM

## 2018-12-28 RX ORDER — PROPOFOL 10 MG/ML
INJECTION, EMULSION INTRAVENOUS AS NEEDED
Status: DISCONTINUED | OUTPATIENT
Start: 2018-12-28 | End: 2018-12-28 | Stop reason: HOSPADM

## 2018-12-28 RX ORDER — LIDOCAINE HYDROCHLORIDE 10 MG/ML
0.1 INJECTION, SOLUTION EPIDURAL; INFILTRATION; INTRACAUDAL; PERINEURAL AS NEEDED
Status: DISCONTINUED | OUTPATIENT
Start: 2018-12-28 | End: 2018-12-28 | Stop reason: HOSPADM

## 2018-12-28 RX ORDER — FENTANYL CITRATE 50 UG/ML
INJECTION, SOLUTION INTRAMUSCULAR; INTRAVENOUS
Status: COMPLETED
Start: 2018-12-28 | End: 2018-12-28

## 2018-12-28 RX ORDER — SODIUM CHLORIDE, SODIUM LACTATE, POTASSIUM CHLORIDE, CALCIUM CHLORIDE 600; 310; 30; 20 MG/100ML; MG/100ML; MG/100ML; MG/100ML
INJECTION, SOLUTION INTRAVENOUS
Status: DISCONTINUED | OUTPATIENT
Start: 2018-12-28 | End: 2018-12-28 | Stop reason: HOSPADM

## 2018-12-28 RX ORDER — SODIUM CHLORIDE 0.9 % (FLUSH) 0.9 %
5-10 SYRINGE (ML) INJECTION AS NEEDED
Status: DISCONTINUED | OUTPATIENT
Start: 2018-12-28 | End: 2018-12-28 | Stop reason: HOSPADM

## 2018-12-28 RX ORDER — LIDOCAINE HYDROCHLORIDE 20 MG/ML
INJECTION, SOLUTION EPIDURAL; INFILTRATION; INTRACAUDAL; PERINEURAL AS NEEDED
Status: DISCONTINUED | OUTPATIENT
Start: 2018-12-28 | End: 2018-12-28 | Stop reason: HOSPADM

## 2018-12-28 RX ORDER — ONDANSETRON 2 MG/ML
4 INJECTION INTRAMUSCULAR; INTRAVENOUS AS NEEDED
Status: DISCONTINUED | OUTPATIENT
Start: 2018-12-28 | End: 2018-12-28 | Stop reason: HOSPADM

## 2018-12-28 RX ORDER — BUPIVACAINE HYDROCHLORIDE AND EPINEPHRINE 5; 5 MG/ML; UG/ML
INJECTION, SOLUTION EPIDURAL; INTRACAUDAL; PERINEURAL AS NEEDED
Status: DISCONTINUED | OUTPATIENT
Start: 2018-12-28 | End: 2018-12-28 | Stop reason: HOSPADM

## 2018-12-28 RX ORDER — ONDANSETRON 2 MG/ML
INJECTION INTRAMUSCULAR; INTRAVENOUS AS NEEDED
Status: DISCONTINUED | OUTPATIENT
Start: 2018-12-28 | End: 2018-12-28 | Stop reason: HOSPADM

## 2018-12-28 RX ORDER — MIDAZOLAM HYDROCHLORIDE 1 MG/ML
INJECTION, SOLUTION INTRAMUSCULAR; INTRAVENOUS AS NEEDED
Status: DISCONTINUED | OUTPATIENT
Start: 2018-12-28 | End: 2018-12-28 | Stop reason: HOSPADM

## 2018-12-28 RX ORDER — FENTANYL CITRATE 50 UG/ML
25 INJECTION, SOLUTION INTRAMUSCULAR; INTRAVENOUS
Status: DISCONTINUED | OUTPATIENT
Start: 2018-12-28 | End: 2018-12-28 | Stop reason: HOSPADM

## 2018-12-28 RX ORDER — FENTANYL CITRATE 50 UG/ML
INJECTION, SOLUTION INTRAMUSCULAR; INTRAVENOUS AS NEEDED
Status: DISCONTINUED | OUTPATIENT
Start: 2018-12-28 | End: 2018-12-28 | Stop reason: HOSPADM

## 2018-12-28 RX ORDER — HYDROCODONE BITARTRATE AND ACETAMINOPHEN 5; 325 MG/1; MG/1
1 TABLET ORAL
Qty: 20 TAB | Refills: 0 | Status: SHIPPED | OUTPATIENT
Start: 2018-12-28

## 2018-12-28 RX ORDER — CEFAZOLIN SODIUM/WATER 2 G/20 ML
2 SYRINGE (ML) INTRAVENOUS ONCE
Status: COMPLETED | OUTPATIENT
Start: 2018-12-28 | End: 2018-12-28

## 2018-12-28 RX ORDER — DEXAMETHASONE SODIUM PHOSPHATE 4 MG/ML
INJECTION, SOLUTION INTRA-ARTICULAR; INTRALESIONAL; INTRAMUSCULAR; INTRAVENOUS; SOFT TISSUE AS NEEDED
Status: DISCONTINUED | OUTPATIENT
Start: 2018-12-28 | End: 2018-12-28 | Stop reason: HOSPADM

## 2018-12-28 RX ORDER — HYDROMORPHONE HYDROCHLORIDE 2 MG/ML
0.5 INJECTION, SOLUTION INTRAMUSCULAR; INTRAVENOUS; SUBCUTANEOUS
Status: DISCONTINUED | OUTPATIENT
Start: 2018-12-28 | End: 2018-12-28 | Stop reason: HOSPADM

## 2018-12-28 RX ORDER — SODIUM CHLORIDE, SODIUM LACTATE, POTASSIUM CHLORIDE, CALCIUM CHLORIDE 600; 310; 30; 20 MG/100ML; MG/100ML; MG/100ML; MG/100ML
50 INJECTION, SOLUTION INTRAVENOUS CONTINUOUS
Status: DISCONTINUED | OUTPATIENT
Start: 2018-12-28 | End: 2018-12-28 | Stop reason: HOSPADM

## 2018-12-28 RX ORDER — MORPHINE SULFATE 10 MG/ML
2 INJECTION, SOLUTION INTRAMUSCULAR; INTRAVENOUS
Status: DISCONTINUED | OUTPATIENT
Start: 2018-12-28 | End: 2018-12-28 | Stop reason: HOSPADM

## 2018-12-28 RX ADMIN — PROPOFOL 25 MG: 10 INJECTION, EMULSION INTRAVENOUS at 07:51

## 2018-12-28 RX ADMIN — PROPOFOL 25 MG: 10 INJECTION, EMULSION INTRAVENOUS at 08:12

## 2018-12-28 RX ADMIN — FENTANYL CITRATE 25 MCG: 50 INJECTION, SOLUTION INTRAMUSCULAR; INTRAVENOUS at 08:51

## 2018-12-28 RX ADMIN — FENTANYL CITRATE 25 MCG: 50 INJECTION, SOLUTION INTRAMUSCULAR; INTRAVENOUS at 07:51

## 2018-12-28 RX ADMIN — PROPOFOL 25 MG: 10 INJECTION, EMULSION INTRAVENOUS at 07:50

## 2018-12-28 RX ADMIN — DEXAMETHASONE SODIUM PHOSPHATE 4 MG: 4 INJECTION, SOLUTION INTRA-ARTICULAR; INTRALESIONAL; INTRAMUSCULAR; INTRAVENOUS; SOFT TISSUE at 07:47

## 2018-12-28 RX ADMIN — LIDOCAINE HYDROCHLORIDE 60 MG: 20 INJECTION, SOLUTION EPIDURAL; INFILTRATION; INTRACAUDAL; PERINEURAL at 07:31

## 2018-12-28 RX ADMIN — FENTANYL CITRATE 25 MCG: 50 INJECTION, SOLUTION INTRAMUSCULAR; INTRAVENOUS at 08:45

## 2018-12-28 RX ADMIN — SODIUM CHLORIDE, SODIUM LACTATE, POTASSIUM CHLORIDE, CALCIUM CHLORIDE: 600; 310; 30; 20 INJECTION, SOLUTION INTRAVENOUS at 07:20

## 2018-12-28 RX ADMIN — FENTANYL CITRATE 50 MCG: 50 INJECTION, SOLUTION INTRAMUSCULAR; INTRAVENOUS at 07:31

## 2018-12-28 RX ADMIN — ONDANSETRON 4 MG: 2 INJECTION INTRAMUSCULAR; INTRAVENOUS at 09:03

## 2018-12-28 RX ADMIN — Medication 2 G: at 07:40

## 2018-12-28 RX ADMIN — FENTANYL CITRATE 25 MCG: 50 INJECTION INTRAMUSCULAR; INTRAVENOUS at 08:45

## 2018-12-28 RX ADMIN — PROPOFOL 150 MG: 10 INJECTION, EMULSION INTRAVENOUS at 07:31

## 2018-12-28 RX ADMIN — FENTANYL CITRATE 25 MCG: 50 INJECTION, SOLUTION INTRAMUSCULAR; INTRAVENOUS at 07:50

## 2018-12-28 RX ADMIN — ONDANSETRON 4 MG: 2 INJECTION INTRAMUSCULAR; INTRAVENOUS at 07:47

## 2018-12-28 RX ADMIN — MIDAZOLAM HYDROCHLORIDE 2 MG: 1 INJECTION, SOLUTION INTRAMUSCULAR; INTRAVENOUS at 07:25

## 2018-12-28 NOTE — OP NOTES
[address] [place] [address] REPORT    Susanna Rodarte  MR#: [id number]  : 1990  ACCOUNT #: [de-identified]   DATE OF SERVICE: 2018    PREOPERATIVE DIAGNOSIS:  Right anterior chest wall mass. POSTOPERATIVE DIAGNOSIS:  Right anterior chest wall mass. PROCEDURE PERFORMED:  Excisional biopsy of right anterior chest wall mass. SURGEON:  Meño Wood MD    SURGICAL ASSISTANT:  None. ANESTHESIA:  General.    ESTIMATED BLOOD LOSS:  5 mL. FINDINGS:  The patient had a 1.5 cm nodule involving the region of the pectoralis fascia and subcutaneous tissue overlying the sternal border of the right anterior chest.    INDICATIONS:  The patient is a 16-year-old female who self palpated a nodule in the right anterior chest.  This had previously been ultrasounded and felt to be either a fibroadenoma or angiolipoma. Over a period of observation, she felt like this lesion has gotten bigger although it did increase in size and then she did feel it had shrunk again as well. She was referred for excisional biopsy. I discussed the option of doing a repeat ultrasound and the patient preferred to proceed with excisional biopsy. A complete discussion of risks, benefits and alternatives to surgery were had with the patient. She was in agreement to proceed. Informed consent was obtained. DESCRIPTION OF OPERATION:  After informed consent was obtained, the patient was brought back to the operating room. She was placed under general anesthesia with an LMA airway. She was then prepped and draped in usual sterile fashion. A proper time-out was performed and the operative site was identified and had been preoperatively marked. With this completed, we injected local anesthetic into the skin and subcutaneous tissue overlying the palpable nodule. This nodule was approximately 1.5-2 cm in size overlying the sternal border of the right anterior chest wall.   This appeared to be separate from her breast tissue. This is at the level of approximately the 3rd or 4th rib. The skin was appropriately anesthetized and then we made a transverse incision overlying the nodule approximately 2 cm in length using 15 blade scalpel, dissected down through subcutaneous tissue to the level of the nodule. We began dissecting around this circumferentially. While the nodule was somewhat mobile, it was apparent that it was fixed or involving the pectoralis fascia of the underlying muscle. We dissected around this circumferentially. I first had removed the subcutaneous fat overlying this and it was then apparent that the nodule was again involving the pectoralis fascial layer. We dissected around this circumferentially using a right angle dissector and electrocautery. The nodule was completely removed and this measured approximately 1.5 x 1.5 cm. This was passed off to pathology. We inspected the operative cavity and good hemostasis was obtained. Again, this dissection was down to the subcutaneous tissue and into the pectoralis muscle fascia as well as muscle fibers. No bone or sternum was exposed. We injected additional local anesthetic and then the incision was closed in 2 layers. This is first done using a 3-0 Vicryl interrupted suture to reapproximate the deep dermis and subcutaneous tissue followed by running 4-0 Monocryl subcuticular stitch to reapproximate the skin. The incision was covered with Exofin skin adhesive. The patient was then awakened, extubated, and taken to the postanesthesia care unit in stable condition. All needle and instrument counts were correct at the completion of the case. I was present and scrubbed for the entirety of the case. There were no immediate complications. SPECIMENS REMOVED: Right anterior chest wall mass. DISPOSITION OF SPECIMENS:  Pathology. COMPLICATIONS:  None. IMPLANTS:  None. DISPOSITION:  To PACU.       MD LUIS MANUEL Marcano / CANDELARIO  D: 12/28/2018 08:42     T: 12/28/2018 10:28  JOB #: [id number]

## 2018-12-28 NOTE — ANESTHESIA POSTPROCEDURE EVALUATION
Post-Anesthesia Evaluation and Assessment Patient: Jermaine Mcdonald MRN: [id number]  SSN: [id number] YOB: 1990  Age: 33 y.o. Sex: female I have evaluated the patient and they are stable and ready for discharge from the PACU. Cardiovascular Function/Vital Signs Visit Vitals /70 Pulse 84 Temp 36.9 °C (98.4 °F) Resp 13 Ht 5' 4\" (1.626 m) Wt 69.4 kg (153 lb) SpO2 99% BMI 26.26 kg/m² Patient is status post General anesthesia for Procedure(s): EXCISIONAL BIOPSY ANTERIOR CHEST WALL MASS. Nausea/Vomiting: None Postoperative hydration reviewed and adequate. Pain: 
Pain Scale 1: Numeric (0 - 10) (12/28/18 0906) Pain Intensity 1: 0 (12/28/18 0906) Managed Neurological Status:  
Neuro (WDL): Within Defined Limits (12/28/18 0906) Neuro LUE Motor Response: Purposeful (12/28/18 0906) LLE Motor Response: Purposeful (12/28/18 0906) RUE Motor Response: Purposeful (12/28/18 0906) RLE Motor Response: Purposeful (12/28/18 0906) At baseline Mental Status, Level of Consciousness: Alert and  oriented to person, place, and time Pulmonary Status:  
O2 Device: Room air (12/28/18 0906) Adequate oxygenation and airway patent Complications related to anesthesia: None Post-anesthesia assessment completed. No concerns Signed By: Shahnaz Jesus MD   
 December 28, 2018 Procedure(s): EXCISIONAL BIOPSY ANTERIOR CHEST WALL MASS. <BSHSIANPOST> Visit Vitals /70 Pulse 84 Temp 36.9 °C (98.4 °F) Resp 13 Ht 5' 4\" (1.626 m) Wt 69.4 kg (153 lb) SpO2 99% BMI 26.26 kg/m²

## 2018-12-28 NOTE — ROUTINE PROCESS
Patient: John Sweet MRN: [id number]  SSN: [id number] YOB: 1990  Age: 33 y.o. Sex: female Patient is status post Procedure(s): EXCISIONAL BIOPSY ANTERIOR CHEST WALL MASS. Surgeon(s) and Role: Monika Mixon MD - Primary Peripheral IV 12/28/18 Left Hand (Active) Airway - Endotracheal Tube 12/28/18 (Active) Dressing/Packing:  Wound Chest Right-DRESSING TYPE: Topical skin adhesive/glue (12/28/18 0752) Splint/Cast:  ]

## 2018-12-28 NOTE — H&P
Date of Surgery Update: 
Izabella Phelan was seen and examined. History and physical has been reviewed. The patient has been examined. There have been no significant clinical changes since the completion of the originally dated History and Physical.  Patient with a subcutaneous nodule on right anterior chest wall. Plan for excisional biopsy. A complete discussion of the risks, benefits and alternatives to surgery were discussed with the patient who was keen to proceed. Signed By: Shivani Alejandro MD   
 December 28, 2018 7:22 AM   
  
 
Please note from the office and include the additional information below: 
 
Past Medical History Past Medical History:  
Diagnosis Date  ADHD  Irregular menses  Varicose veins 2014 Dr. Ra Valle (Reflux in left Greater Saphenous) s/p thermal ablation and micro-phlebectomy Past Surgical History Past Surgical History:  
Procedure Laterality Date  HX HEENT    
 wisdom teeth extraction  HX ORTHOPAEDIC    
 LEFT BLADE BUNIONECTOMY  HX OTHER SURGICAL Left Micro Phlebectomy- Ra Valle, Vascular surgery  VASCULAR SURGERY PROCEDURE UNLIST  9/2015  
 vein removed in left calf and thigh Social History The patient Izabella Phelan  reports that  has never smoked. she has never used smokeless tobacco. She reports that she drinks about 1.2 - 1.8 oz of alcohol per week. She reports that she does not use drugs. Family History Family History Problem Relation Age of Onset  Hypertension Father  Depression Father  Other Father   
     arthritis in foot/bone spurs in foot  Cancer Maternal Grandmother   
     lung  Cancer Maternal Grandfather   
     pancreatic  Prostate Cancer Maternal Grandfather  Stroke Paternal Grandmother Shivani Alejandro MD

## 2018-12-28 NOTE — ANESTHESIA PREPROCEDURE EVALUATION
Anesthetic History No history of anesthetic complications Review of Systems / Medical History Patient summary reviewed, nursing notes reviewed and pertinent labs reviewed Pulmonary Within defined limits Neuro/Psych Within defined limits Cardiovascular Exercise tolerance: >4 METS 
  
GI/Hepatic/Renal 
Within defined limits Endo/Other Other Findings Comments: fibroadenoma Physical Exam 
 
Airway Mallampati: I 
TM Distance: > 6 cm Neck ROM: normal range of motion Mouth opening: Normal 
 
 Cardiovascular Rhythm: regular Rate: normal 
 
 
 
 Dental 
No notable dental hx Pulmonary Breath sounds clear to auscultation Abdominal 
 
 
 
 Other Findings Anesthetic Plan ASA: 1 Anesthesia type: general 
 
 
 
 
Induction: Intravenous Anesthetic plan and risks discussed with: Patient

## 2018-12-28 NOTE — BRIEF OP NOTE
BRIEF OPERATIVE NOTE Date of Procedure: 12/28/2018 Preoperative Diagnosis: RIGHT ANTERIOR CHEST WALL MASS Postoperative Diagnosis: RIGHT ANTERIOR CHEST WALL MASS Procedure(s): EXCISIONAL BIOPSY ANTERIOR CHEST WALL MASS Surgeon(s) and Role: Anabell Sotelo MD - Primary Surgical Assistant: None Surgical Staff: 
Circ-1: Negrito Scruggs Scrub RN-1: Storm Mcneill Time In Time Out Incision Start 9602 Incision Close 0681 Anesthesia: General  
Estimated Blood Loss: 5 mL Specimens:  
ID Type Source Tests Collected by Time Destination 1 : right anterior chest wall mass Fresh Chest  Gabriela Morales MD 12/28/2018 0226 Pathology Findings: 1.5 cm nodule involving pectoralis fascia and subcutaneous tissue. Complications: None Implants: * No implants in log *

## 2018-12-28 NOTE — DISCHARGE INSTRUCTIONS
Patient Discharge Instructions    [facility] / [id number] : 1990    Admitted 2018 Discharged: 2018       · It is important that you take the medication exactly as they are prescribed. · Keep your medication in the bottles provided by the pharmacist and keep a list of the medication names, dosages, and times to be taken in your wallet. · Do not take other medications without consulting your doctor. What to do at Home    Recommended diet: Resume prior diet. Narcotic pain medications can cause constipation. If you feel constipated, you are encouraged to take an over the counter stool softener or mild laxative as needed. Recommended activity: No Heavy Lifting or strenuous activity for 2-3 days until soreness resolves. No Driving While Taking narcotic pain medications. May Take Shower 1 day after surgery. Do not submerge your incision under water (bath, pool, hot tub) for at least 7 days. If you experience any of the following symptoms Fevers, Chills, Nausea, Vomitting, Redness or Drainage at Surgical Site(s) or Any Other Questions or Concerns Please Call -  [phone]. Follow-up with Dr. Mary Mark in ~14 days. ______________________________________________________________________    Anesthesia Discharge Instructions    After general anesthesia or intervenous sedation, for 24 hours or while taking prescription Narcotics:  · Limit your activities  · Do not drive or operate hazardous machinery  · If you have not urinated within 8 hours after discharge, please contact your surgeon on call.   · Do not make important personal or business decisions  · Do not drink alcoholic beverages    Report the following to your surgeon:  · Excessive pain, swelling, redness or odor of or around the surgical area  · Temperature over 100.5 degrees  · Nausea and vomiting lasting longer than 4 hours or if unable to take medication  · Any signs of decreased circulation or nerve impairment to extremity:  Change in color, persistent numbness, tingling, coldness or increased pain. · Any questions          Information obtained by :  I understand that if any problems occur once I am at home I am to contact my physician. I understand and acknowledge receipt of the instructions indicated above.                                                                                                                                            Physician's or R.N.'s Signature                                                                  Date/Time                                                                                                                                              Patient or Representative Signature                                                          Date/Time

## 2019-01-15 ENCOUNTER — OFFICE VISIT (OUTPATIENT)
Dept: SURGERY | Age: 29
End: 2019-01-15

## 2019-01-15 VITALS
HEART RATE: 93 BPM | OXYGEN SATURATION: 98 % | TEMPERATURE: 98 F | HEIGHT: 64 IN | SYSTOLIC BLOOD PRESSURE: 138 MMHG | BODY MASS INDEX: 25.95 KG/M2 | RESPIRATION RATE: 18 BRPM | WEIGHT: 152 LBS | DIASTOLIC BLOOD PRESSURE: 80 MMHG

## 2019-01-15 DIAGNOSIS — R22.2 NODULE OF ANTERIOR CHEST WALL: Primary | ICD-10-CM

## 2019-01-15 NOTE — PROGRESS NOTES
1. Have you been to the ER, urgent care clinic since your last visit? Hospitalized since your last visit? No    2. Have you seen or consulted any other health care providers outside of the [address] [place] [address] since your last visit? Include any pap smears or colon screening.  No

## 2019-01-16 NOTE — PROGRESS NOTES
[id number] [facility]      Clinic Note - Follow up    Subjective     Sindy العلي returns for scheduled follow up today. She is s/p excision of a nodule from her anterior chest wall on 12/28/18. Her final pathology revealed a hemangioma. The patient has had no issues since surgery. Her incision is healing nicely. She has no complaints. Objective     Visit Vitals  /80 (BP 1 Location: Left arm, BP Patient Position: Sitting)   Pulse 93   Temp 98 °F (36.7 °C) (Oral)   Resp 18   Ht 5' 4\" (1.626 m)   Wt 152 lb (68.9 kg)   LMP 12/23/2018   SpO2 98%   BMI 26.09 kg/m²         PE  GEN - Awake, alert, communicating appropriately. NAD  Chest - incision c/d/i, no signs of infection. Labs  None    Assessment     Sindy العلي is a 33 y. o.yr old female s/p excision of a nodule from her anterior chest wall on 12/28/18. Her final pathology revealed a hemangioma. Plan     I have reviewed her pathology with her. This is a benign lesion with no malignant potential.  I will plan to see her back as needed. I have encouraged her to have routine breast screening done per national guidelines beginning at age 36.       Kennedy Carter MD  1/15/19    CC: Patrick Ochoa NP

## 2022-03-19 PROBLEM — R22.9 SKIN NODULE: Status: ACTIVE | Noted: 2018-08-09

## 2022-03-20 PROBLEM — R22.2 NODULE OF ANTERIOR CHEST WALL: Status: ACTIVE | Noted: 2018-12-28

## 2023-05-21 RX ORDER — HYDROCODONE BITARTRATE AND ACETAMINOPHEN 5; 325 MG/1; MG/1
1 TABLET ORAL EVERY 4 HOURS PRN
COMMUNITY
Start: 2018-12-28

## 2023-05-21 RX ORDER — DESOGESTREL AND ETHINYL ESTRADIOL 21-5 (28)
KIT ORAL
COMMUNITY

## 2023-05-21 RX ORDER — DEXTROAMPHETAMINE SACCHARATE, AMPHETAMINE ASPARTATE, DEXTROAMPHETAMINE SULFATE AND AMPHETAMINE SULFATE 5; 5; 5; 5 MG/1; MG/1; MG/1; MG/1
TABLET ORAL
COMMUNITY
Start: 2018-05-29

## (undated) DEVICE — NEEDLE HYPO 25GA L1.5IN BVL ORIENTED ECLIPSE

## (undated) DEVICE — SUTURE VCRL SZ 3-0 L27IN ABSRB UD L26MM SH 1/2 CIR J416H

## (undated) DEVICE — APPLICATOR BNDG 1MM ADH PREMIERPRO EXOFIN

## (undated) DEVICE — STERILE POLYISOPRENE POWDER-FREE SURGICAL GLOVES WITH EMOLLIENT COATING: Brand: PROTEXIS

## (undated) DEVICE — (D)PREP SKN CHLRAPRP APPL 26ML -- CONVERT TO ITEM 371833

## (undated) DEVICE — DRAPE,UTILTY,TAPE,15X26, 4EA/PK: Brand: MEDLINE

## (undated) DEVICE — DEVON™ KNEE AND BODY STRAP 60" X 3" (1.5 M X 7.6 CM): Brand: DEVON

## (undated) DEVICE — SURGICAL PROCEDURE PACK BASIN MAJ SET CUST NO CAUT

## (undated) DEVICE — TOWEL SURG W17XL27IN STD BLU COT NONFENESTRATED PREWASHED

## (undated) DEVICE — REM POLYHESIVE ADULT PATIENT RETURN ELECTRODE: Brand: VALLEYLAB

## (undated) DEVICE — KENDALL SCD EXPRESS SLEEVES, KNEE LENGTH, MEDIUM: Brand: KENDALL SCD

## (undated) DEVICE — ROCKER SWITCH PENCIL BLADE ELECTRODE, HOLSTER: Brand: EDGE

## (undated) DEVICE — DBD-PACK,LAPAROTOMY,2 REINFORCED GOWNS: Brand: MEDLINE

## (undated) DEVICE — HANDLE LT SNAP ON ULT DURABLE LENS FOR TRUMPF ALC DISPOSABLE

## (undated) DEVICE — SUTURE MCRYL SZ 4-0 L27IN ABSRB UD L19MM PS-2 1/2 CIR PRIM Y426H

## (undated) DEVICE — SOLUTION IV 1000ML 0.9% SOD CHL

## (undated) DEVICE — INFECTION CONTROL KIT SYS